# Patient Record
Sex: FEMALE | Race: WHITE | NOT HISPANIC OR LATINO | URBAN - METROPOLITAN AREA
[De-identification: names, ages, dates, MRNs, and addresses within clinical notes are randomized per-mention and may not be internally consistent; named-entity substitution may affect disease eponyms.]

---

## 2017-09-27 ENCOUNTER — APPOINTMENT (OUTPATIENT)
Dept: URBAN - METROPOLITAN AREA SURGERY 9 | Age: 42
Setting detail: DERMATOLOGY
End: 2017-09-27

## 2017-09-27 DIAGNOSIS — D22 MELANOCYTIC NEVI: ICD-10-CM

## 2017-09-27 DIAGNOSIS — L71.8 OTHER ROSACEA: ICD-10-CM

## 2017-09-27 PROBLEM — D22.62 MELANOCYTIC NEVI OF LEFT UPPER LIMB, INCLUDING SHOULDER: Status: ACTIVE | Noted: 2017-09-27

## 2017-09-27 PROCEDURE — OTHER PRESCRIPTION: OTHER

## 2017-09-27 PROCEDURE — OTHER REASSURANCE: OTHER

## 2017-09-27 PROCEDURE — OTHER COUNSELING: OTHER

## 2017-09-27 PROCEDURE — 99214 OFFICE O/P EST MOD 30 MIN: CPT

## 2017-09-27 PROCEDURE — OTHER TREATMENT REGIMEN: OTHER

## 2017-09-27 RX ORDER — DOXYCYCLINE HYCLATE 50 MG/1
CAPSULE, GELATIN COATED ORAL
Qty: 60 | Refills: 2 | Status: ERX | COMMUNITY
Start: 2017-09-27

## 2017-09-27 ASSESSMENT — LOCATION DETAILED DESCRIPTION DERM
LOCATION DETAILED: LEFT INFERIOR CENTRAL MALAR CHEEK
LOCATION DETAILED: LEFT DISTAL POSTERIOR UPPER ARM

## 2017-09-27 ASSESSMENT — LOCATION SIMPLE DESCRIPTION DERM
LOCATION SIMPLE: LEFT CHEEK
LOCATION SIMPLE: LEFT POSTERIOR UPPER ARM

## 2017-09-27 ASSESSMENT — LOCATION ZONE DERM
LOCATION ZONE: ARM
LOCATION ZONE: FACE

## 2017-09-27 NOTE — PROCEDURE: TREATMENT REGIMEN
Plan: Consider adding Soolantra and Rhofade at follow up if not better with current regimen (may want to consider introducing one at time as patient has very sensitive skin to topicals)
Detail Level: Simple
Initiate Treatment: Doxycycline 50MG twice daily
Otc Regimen: Mild cleansers and moisturizers
Continue Regimen: Clindamycin lotion once to twice daily

## 2019-11-14 ENCOUNTER — APPOINTMENT (OUTPATIENT)
Dept: URBAN - METROPOLITAN AREA SURGERY 9 | Age: 44
Setting detail: DERMATOLOGY
End: 2019-11-14

## 2019-11-14 DIAGNOSIS — L71.8 OTHER ROSACEA: ICD-10-CM

## 2019-11-14 PROCEDURE — OTHER PRESCRIPTION: OTHER

## 2019-11-14 PROCEDURE — 99213 OFFICE O/P EST LOW 20 MIN: CPT

## 2019-11-14 PROCEDURE — OTHER COUNSELING: OTHER

## 2019-11-14 PROCEDURE — OTHER TREATMENT REGIMEN: OTHER

## 2019-11-14 RX ORDER — CLINDAMYCIN PHOSPHATE 10 MG/ML
LOTION TOPICAL
Qty: 1 | Refills: 11 | Status: ERX | COMMUNITY
Start: 2019-11-14

## 2019-11-14 RX ORDER — MINOCYCLINE HYDROCHLORIDE 50 MG/1
CAPSULE ORAL
Qty: 60 | Refills: 2 | Status: ERX | COMMUNITY
Start: 2019-11-14

## 2019-11-14 RX ORDER — IVERMECTIN 10 MG/G
CREAM TOPICAL
Qty: 1 | Refills: 5 | Status: ERX | COMMUNITY
Start: 2019-11-14

## 2019-11-14 ASSESSMENT — LOCATION DETAILED DESCRIPTION DERM: LOCATION DETAILED: LEFT LATERAL MALAR CHEEK

## 2019-11-14 ASSESSMENT — LOCATION ZONE DERM: LOCATION ZONE: FACE

## 2019-11-14 ASSESSMENT — LOCATION SIMPLE DESCRIPTION DERM: LOCATION SIMPLE: LEFT CHEEK

## 2019-11-14 NOTE — PROCEDURE: TREATMENT REGIMEN
Initiate Treatment: Clindamycin every morning, soolantra every night, mcn as directed
Detail Level: Simple

## 2019-11-20 RX ORDER — DOXYCYCLINE HYCLATE 50 MG/1
CAPSULE, GELATIN COATED ORAL
Qty: 60 | Refills: 2 | Status: ERX | COMMUNITY
Start: 2019-11-20